# Patient Record
Sex: FEMALE | Race: OTHER | URBAN - METROPOLITAN AREA
[De-identification: names, ages, dates, MRNs, and addresses within clinical notes are randomized per-mention and may not be internally consistent; named-entity substitution may affect disease eponyms.]

---

## 2019-02-21 ENCOUNTER — OUTPATIENT (OUTPATIENT)
Dept: OUTPATIENT SERVICES | Facility: HOSPITAL | Age: 69
LOS: 1 days | Discharge: HOME | End: 2019-02-21

## 2019-02-21 DIAGNOSIS — R74.8 ABNORMAL LEVELS OF OTHER SERUM ENZYMES: ICD-10-CM

## 2023-03-27 ENCOUNTER — APPOINTMENT (OUTPATIENT)
Dept: CARE COORDINATION | Facility: HOME HEALTH | Age: 73
End: 2023-03-27

## 2023-03-27 PROBLEM — Z00.00 ENCOUNTER FOR PREVENTIVE HEALTH EXAMINATION: Status: ACTIVE | Noted: 2023-03-27

## 2023-03-31 ENCOUNTER — APPOINTMENT (OUTPATIENT)
Dept: CARE COORDINATION | Facility: HOME HEALTH | Age: 73
End: 2023-03-31

## 2023-03-31 ENCOUNTER — NON-APPOINTMENT (OUTPATIENT)
Age: 73
End: 2023-03-31

## 2023-03-31 DIAGNOSIS — I50.9 HEART FAILURE, UNSPECIFIED: ICD-10-CM

## 2023-03-31 DIAGNOSIS — F41.9 ANXIETY DISORDER, UNSPECIFIED: ICD-10-CM

## 2023-03-31 DIAGNOSIS — G89.29 OTHER CHRONIC PAIN: ICD-10-CM

## 2023-03-31 DIAGNOSIS — Z85.038 PERSONAL HISTORY OF OTHER MALIGNANT NEOPLASM OF LARGE INTESTINE: ICD-10-CM

## 2023-03-31 DIAGNOSIS — E78.5 HYPERLIPIDEMIA, UNSPECIFIED: ICD-10-CM

## 2023-03-31 DIAGNOSIS — F33.42 MAJOR DEPRESSIVE DISORDER, RECURRENT, IN FULL REMISSION: ICD-10-CM

## 2023-03-31 DIAGNOSIS — K21.9 GASTRO-ESOPHAGEAL REFLUX DISEASE W/OUT ESOPHAGITIS: ICD-10-CM

## 2023-03-31 DIAGNOSIS — Z86.718 PERSONAL HISTORY OF OTHER VENOUS THROMBOSIS AND EMBOLISM: ICD-10-CM

## 2023-05-28 ENCOUNTER — NON-APPOINTMENT (OUTPATIENT)
Age: 73
End: 2023-05-28

## 2023-05-28 PROBLEM — I50.9 CONGESTIVE HEART FAILURE: Status: ACTIVE | Noted: 2023-05-28

## 2023-05-28 PROBLEM — F33.42 RECURRENT MAJOR DEPRESSIVE DISORDER, IN FULL REMISSION: Status: ACTIVE | Noted: 2023-05-28

## 2023-05-28 PROBLEM — E78.5 HLD (HYPERLIPIDEMIA): Status: ACTIVE | Noted: 2023-05-28

## 2023-05-28 PROBLEM — F41.9 ANXIETY: Status: ACTIVE | Noted: 2023-05-28

## 2023-05-28 PROBLEM — K21.9 GERD (GASTROESOPHAGEAL REFLUX DISEASE): Status: ACTIVE | Noted: 2023-05-28

## 2023-05-28 PROBLEM — Z86.718 HISTORY OF DVT (DEEP VEIN THROMBOSIS): Status: ACTIVE | Noted: 2023-05-28

## 2023-05-28 PROBLEM — G89.29 CHRONIC PAIN: Status: ACTIVE | Noted: 2023-05-28

## 2023-05-28 PROBLEM — Z85.038 HISTORY OF COLON CANCER: Status: ACTIVE | Noted: 2023-05-28

## 2023-05-28 RX ORDER — LORAZEPAM 0.5 MG/1
0.5 TABLET ORAL AT BEDTIME
Qty: 1 | Refills: 0 | Status: ACTIVE | COMMUNITY
Start: 2023-05-28

## 2023-05-28 RX ORDER — FAMOTIDINE 40 MG/1
40 TABLET, FILM COATED ORAL DAILY
Qty: 30 | Refills: 0 | Status: ACTIVE | COMMUNITY
Start: 2023-05-28

## 2023-05-28 RX ORDER — FERROUS SULFATE 325(65) MG
325 (65 FE) TABLET ORAL DAILY
Qty: 30 | Refills: 0 | Status: ACTIVE | COMMUNITY
Start: 2023-05-28

## 2023-05-28 RX ORDER — POTASSIUM CHLORIDE 1500 MG/1
20 TABLET, FILM COATED, EXTENDED RELEASE ORAL DAILY
Qty: 1 | Refills: 0 | Status: ACTIVE | COMMUNITY
Start: 2023-05-28

## 2023-05-28 RX ORDER — APIXABAN 5 MG/1
5 TABLET, FILM COATED ORAL
Qty: 180 | Refills: 1 | Status: ACTIVE | COMMUNITY
Start: 2023-05-28

## 2023-05-28 RX ORDER — ATORVASTATIN CALCIUM 20 MG/1
20 TABLET, FILM COATED ORAL
Qty: 1 | Refills: 1 | Status: ACTIVE | COMMUNITY
Start: 2023-05-28

## 2023-05-28 RX ORDER — ACETAMINOPHEN 325 MG/1
325 TABLET, FILM COATED ORAL EVERY 6 HOURS
Qty: 1 | Refills: 0 | Status: ACTIVE | COMMUNITY
Start: 2023-05-28

## 2023-05-28 RX ORDER — PANCRELIPASE 120000; 24000; 76000 [USP'U]/1; [USP'U]/1; [USP'U]/1
24000-76000 CAPSULE, DELAYED RELEASE PELLETS ORAL
Qty: 270 | Refills: 2 | Status: ACTIVE | COMMUNITY
Start: 2023-05-28

## 2023-05-28 RX ORDER — CHLORTHALIDONE 25 MG/1
25 TABLET ORAL DAILY
Qty: 1 | Refills: 0 | Status: ACTIVE | COMMUNITY
Start: 2023-05-28

## 2023-05-28 RX ORDER — ASPIRIN ENTERIC COATED TABLETS 81 MG 81 MG/1
81 TABLET, DELAYED RELEASE ORAL DAILY
Qty: 1 | Refills: 0 | Status: ACTIVE | COMMUNITY
Start: 2023-05-28

## 2023-05-28 RX ORDER — AMLODIPINE BESYLATE 5 MG/1
5 TABLET ORAL DAILY
Qty: 90 | Refills: 0 | Status: ACTIVE | COMMUNITY
Start: 2023-05-28

## 2023-05-28 RX ORDER — MELOXICAM 7.5 MG/1
7.5 TABLET ORAL
Qty: 30 | Refills: 1 | Status: ACTIVE | COMMUNITY
Start: 2023-05-28

## 2023-05-28 RX ORDER — LOSARTAN POTASSIUM 100 MG/1
100 TABLET, FILM COATED ORAL
Qty: 90 | Refills: 1 | Status: ACTIVE | COMMUNITY
Start: 2023-05-28

## 2023-05-28 RX ORDER — ICOSAPENT ETHYL 1 G/1
1 CAPSULE ORAL 3 TIMES DAILY
Qty: 1 | Refills: 0 | Status: ACTIVE | COMMUNITY
Start: 2023-05-28

## 2023-05-28 NOTE — HISTORY OF PRESENT ILLNESS
[Home] : at home, [unfilled] , at the time of the visit. [Other Location: e.g. Home (Enter Location, City,State)___] : at [unfilled] [Verbal consent obtained from patient] : the patient, [unfilled] [de-identified] : Patient experience difficulty trying to connect via video visit, Daughter Lexii not home at the time of visit. Visit completed via phone with [place] . [facility] quality initiative provider note: 72 year old female with history of anxiety, chronic pain, CHF, GERD, DVT, HLD, colon sx 2012 again 2014, along with chemo and radiation therapy. Patient report doing well. No complaints offered at this time. \par Awake, alert and oriented x4, but forgetful at this, in no acute distress. Denies any chest pain, shortness of breath, palpitation or dizziness. Patient verified medications and medical diagnosis. Report taking medications as prescribed.\par \par Emergency Contact/Caretaker timmy Shultz [phone]

## 2023-05-28 NOTE — HEALTH RISK ASSESSMENT
[No] : In the past 12 months have you used drugs other than those required for medical reasons? No [No falls in past year] : Patient reported no falls in the past year [0] : 2) Feeling down, depressed, or hopeless: Not at all (0) [Not at All (0)] : 9.) Thoughts that you would be off dead or of hurting yourself in some way? Not at all [I have developed a follow-up plan documented below in the note.] : I have developed a follow-up plan documented below in the note. [None] : None [Alone] : lives alone [# Of Children ___] : has [unfilled] children [Feels Safe at Home] : Feels safe at home [Smoke Detector] : smoke detector [Carbon Monoxide Detector] : carbon monoxide detector [Seat Belt] :  uses seat belt [[id number]] : 0 [Reports changes in dental health] : Reports no changes in dental health [Guns at Home] : no guns at home [Travel to Developing Areas] : does not  travel to developing areas [TB Exposure] : is not being exposed to tuberculosis

## 2023-05-28 NOTE — PHYSICAL EXAM
[de-identified] : Telehealth precludes traditional, comprehensive physical exam. Patient appeared stable and alert.

## 2023-05-28 NOTE — ASSESSMENT
[FreeTextEntry1] : Educated about importance of healthy diet, physical activity, proper sleep (8 hours of sleep), importance of screening test for early detection and treatment of cancer. Importance of immunizations including COVID-19 and seasonal flu vaccine in order to prevent or lessen disease.\par \par Patient encounter incorporated clinical review of the medical record including consultation from specialists, review of lab and diagnostic testing with interpretation and discussion of results with patient and/or primary care taker, general patient counseling and coordination of care as well documentation update within the electronic medical record. All patient questions/concerns addressed during time of visit.\par

## 2023-05-28 NOTE — REVIEW OF SYSTEMS
[Joint Pain] : joint pain [Joint Stiffness] : joint stiffness [Joint Swelling] : joint swelling [Muscle Weakness] : muscle weakness [Memory Loss] : memory loss [Unsteady Walking] : ataxia [Anxiety] : anxiety [Negative] : Heme/Lymph [Suicidal] : not suicidal [Insomnia] : no insomnia [Depression] : no depression

## 2023-05-28 NOTE — INTERPRETER SERVICES
[[employer] ] : provided by [employer]   [Time Spent: ____ minutes] : Total time spent using  services: [unfilled] minutes. The patient's primary language is not English thus required  services. [Interpreters_IDNumber] : [id number] [Interpreters_FullName] : [email or web address]